# Patient Record
Sex: FEMALE | Race: WHITE | URBAN - METROPOLITAN AREA
[De-identification: names, ages, dates, MRNs, and addresses within clinical notes are randomized per-mention and may not be internally consistent; named-entity substitution may affect disease eponyms.]

---

## 2023-11-27 ENCOUNTER — APPOINTMENT (RX ONLY)
Dept: URBAN - METROPOLITAN AREA OTHER 11 | Facility: OTHER | Age: 78
Setting detail: DERMATOLOGY
End: 2023-11-27

## 2023-11-27 DIAGNOSIS — L98.9 DISORDER OF THE SKIN AND SUBCUTANEOUS TISSUE, UNSPECIFIED: ICD-10-CM | Status: RESOLVING

## 2023-11-27 PROCEDURE — ? PRESCRIPTION

## 2023-11-27 PROCEDURE — 99202 OFFICE O/P NEW SF 15 MIN: CPT

## 2023-11-27 PROCEDURE — ? COUNSELING

## 2023-11-27 PROCEDURE — ? TREATMENT REGIMEN

## 2023-11-27 RX ORDER — TRIAMCINOLONE ACETONIDE 1 MG/G
THIN LAYER OINTMENT TOPICAL QHS
Qty: 80 | Refills: 1 | Status: ERX | COMMUNITY
Start: 2023-11-27

## 2023-11-27 RX ADMIN — TRIAMCINOLONE ACETONIDE THIN LAYER: 1 OINTMENT TOPICAL at 00:00

## 2023-11-27 ASSESSMENT — LOCATION DETAILED DESCRIPTION DERM
LOCATION DETAILED: RIGHT POSTERIOR SHOULDER
LOCATION DETAILED: LEFT VENTRAL DISTAL FOREARM
LOCATION DETAILED: LEFT POSTERIOR SHOULDER
LOCATION DETAILED: LEFT PROXIMAL DORSAL FOREARM
LOCATION DETAILED: RIGHT PROXIMAL RADIAL DORSAL FOREARM

## 2023-11-27 ASSESSMENT — LOCATION ZONE DERM: LOCATION ZONE: ARM

## 2023-11-27 ASSESSMENT — LOCATION SIMPLE DESCRIPTION DERM
LOCATION SIMPLE: RIGHT SHOULDER
LOCATION SIMPLE: RIGHT FOREARM
LOCATION SIMPLE: LEFT SHOULDER
LOCATION SIMPLE: LEFT FOREARM

## 2023-11-27 NOTE — PROCEDURE: TREATMENT REGIMEN
Plan: Patient will contact clinic when flare occurs and will consider biopsy
Detail Level: Simple
Initiate Treatment: Triamcinolone ointment PRN for itching